# Patient Record
Sex: FEMALE | Race: WHITE | NOT HISPANIC OR LATINO | Employment: UNEMPLOYED | URBAN - METROPOLITAN AREA
[De-identification: names, ages, dates, MRNs, and addresses within clinical notes are randomized per-mention and may not be internally consistent; named-entity substitution may affect disease eponyms.]

---

## 2019-02-05 ENCOUNTER — OFFICE VISIT (OUTPATIENT)
Dept: SURGERY | Facility: CLINIC | Age: 24
End: 2019-02-05

## 2019-02-05 VITALS
HEIGHT: 62 IN | SYSTOLIC BLOOD PRESSURE: 112 MMHG | BODY MASS INDEX: 25.47 KG/M2 | DIASTOLIC BLOOD PRESSURE: 66 MMHG | WEIGHT: 138.4 LBS

## 2019-02-05 DIAGNOSIS — Z30.46: Primary | ICD-10-CM

## 2019-02-05 PROCEDURE — 99203 OFFICE O/P NEW LOW 30 MIN: CPT | Performed by: SURGERY

## 2019-02-05 NOTE — PROGRESS NOTES
"Chief Complaint   Patient presents with   • Foreign Body     would like birth control removed from left arm, birth control causing her to have menstrual cycle 2-3 weeks at a time.        Patient is a 23 y.o. female referred by Charly Willingham MD for removal of birth control implant from left upper arm.  Patient reports is been in for 2 years but she is having problems with irregular menstrual cycles and is desiring to have it removed.  Patient understands that she will need an alternative birth control method.  Patient denies fever, chills, nausea or vomiting.     Past Medical History:   Diagnosis Date   • Anxiety    • Depression      No past surgical history on file.  No family history on file.  Social History     Tobacco Use   • Smoking status: Current Some Day Smoker     Packs/day: 0.50     Types: Cigarettes   Substance Use Topics   • Alcohol use: Yes     Comment: \"rarely\"   • Drug use: Yes     Frequency: 7.0 times per week     Types: Marijuana, Amphetamines, Benzodiazepines     No Known Allergies    Current Outpatient Medications:   •  citalopram (CeleXA) 10 MG tablet, Take 10 mg by mouth 2 (two) times a day. Took 20 tabs o/d on 9/14/16, Disp: , Rfl:   •  risperiDONE (RisperDAL) 1 MG tablet, Take 1 mg by mouth every night. Took overdose 9/14/16- 20 tabs, Disp: , Rfl:     Review of Systems   Constitutional: Negative.    HENT: Negative.    Eyes: Negative.    Respiratory: Negative.    Cardiovascular: Negative.    Gastrointestinal: Negative.    Endocrine: Negative.    Genitourinary: Negative.    Musculoskeletal: Positive for back pain.   Skin: Negative.    Allergic/Immunologic: Negative.    Neurological: Negative.    Hematological: Negative.    Psychiatric/Behavioral: Negative.        Vitals:    02/05/19 1012   BP: 112/66   Weight: 62.8 kg (138 lb 6.4 oz)   Height: 157.5 cm (62\")       Physical Exam  General/physcological:   Alert and oriented x3, in no acute distress  HEENT: Normal cephalic, atraumatic, PERRLA, " EOMI, sclera anicteric, moist mucous membranes, neck is supple, no JVD, no carotid bruits, no thyromegaly no adenopathy  Respiratory: CTA and percussion  CVA: RRR, normal S1-S2, no murmurs, no gallops or rubs  GI: Positive BS, soft, nondistended, nontender, no rebound, no guarding, no hernias, no organomegaly and no masses  Musculoskeletal: Full range of motion, no clubbing, no cyanosis or edema, right upper inner arm palpable  birth control implant  Neurovascular: Grossly intact  Debilities: none  Emotional behavior: appropriate     Assessment:  Right upper arm birth control implant  Plan:  Patient presenting to have this removed.  I have advised her that we do this under light anesthesia.  I have discussed this procedure in detail with patient.  I have discussed the risks, benefits and alternatives.  I have discussed the risk of pregnancy, infection, anesthesia and bleeding.  Patient understands these risk, benefits and alternatives and wishes to proceed.    Haylee Alcantara MD  General, Minimally Invasive and Endoscopic Surgery  [facility]      [facility]– [place][note 2]0 [facility] [address] [place] [address] [address]    [address]  [place], [place] [id number] [place] [id number]    P: [phone]  F: [phone]    Cc:  Charly Willingham MD

## 2019-03-22 ENCOUNTER — LAB REQUISITION (OUTPATIENT)
Dept: LAB | Facility: HOSPITAL | Age: 24
End: 2019-03-22

## 2019-03-22 ENCOUNTER — OUTSIDE FACILITY SERVICE (OUTPATIENT)
Dept: SURGERY | Facility: CLINIC | Age: 24
End: 2019-03-22

## 2019-03-22 DIAGNOSIS — S40.852D: ICD-10-CM

## 2019-03-22 PROCEDURE — 88300 SURGICAL PATH GROSS: CPT | Performed by: SURGERY

## 2019-03-22 PROCEDURE — 11982 REMOVE DRUG IMPLANT DEVICE: CPT | Performed by: SURGERY

## 2019-03-25 LAB
LAB AP CASE REPORT: NORMAL
LAB AP CLINICAL INFORMATION: NORMAL
PATH REPORT.FINAL DX SPEC: NORMAL
PATH REPORT.GROSS SPEC: NORMAL

## 2021-04-16 ENCOUNTER — BULK ORDERING (OUTPATIENT)
Dept: CASE MANAGEMENT | Facility: OTHER | Age: 26
End: 2021-04-16

## 2021-04-16 DIAGNOSIS — Z23 IMMUNIZATION DUE: ICD-10-CM
